# Patient Record
Sex: FEMALE | Race: WHITE | NOT HISPANIC OR LATINO | Employment: FULL TIME | URBAN - METROPOLITAN AREA
[De-identification: names, ages, dates, MRNs, and addresses within clinical notes are randomized per-mention and may not be internally consistent; named-entity substitution may affect disease eponyms.]

---

## 2020-10-01 ENCOUNTER — TRANSFERRED RECORDS (OUTPATIENT)
Dept: HEALTH INFORMATION MANAGEMENT | Facility: CLINIC | Age: 20
End: 2020-10-01

## 2021-04-20 ENCOUNTER — TRANSFERRED RECORDS (OUTPATIENT)
Dept: HEALTH INFORMATION MANAGEMENT | Facility: CLINIC | Age: 21
End: 2021-04-20

## 2022-04-24 ENCOUNTER — HEALTH MAINTENANCE LETTER (OUTPATIENT)
Age: 22
End: 2022-04-24

## 2022-06-30 ENCOUNTER — TRANSFERRED RECORDS (OUTPATIENT)
Dept: HEALTH INFORMATION MANAGEMENT | Facility: CLINIC | Age: 22
End: 2022-06-30

## 2022-11-19 ENCOUNTER — HEALTH MAINTENANCE LETTER (OUTPATIENT)
Age: 22
End: 2022-11-19

## 2023-06-01 ENCOUNTER — HEALTH MAINTENANCE LETTER (OUTPATIENT)
Age: 23
End: 2023-06-01

## 2023-12-28 ENCOUNTER — OFFICE VISIT (OUTPATIENT)
Dept: FAMILY MEDICINE | Facility: CLINIC | Age: 23
End: 2023-12-28
Payer: COMMERCIAL

## 2023-12-28 VITALS
WEIGHT: 138.3 LBS | HEART RATE: 80 BPM | BODY MASS INDEX: 22.23 KG/M2 | HEIGHT: 66 IN | OXYGEN SATURATION: 98 % | RESPIRATION RATE: 16 BRPM | DIASTOLIC BLOOD PRESSURE: 66 MMHG | TEMPERATURE: 98 F | SYSTOLIC BLOOD PRESSURE: 110 MMHG

## 2023-12-28 DIAGNOSIS — R79.89 TSH ELEVATION: ICD-10-CM

## 2023-12-28 DIAGNOSIS — G43.009 MIGRAINE WITHOUT AURA AND WITHOUT STATUS MIGRAINOSUS, NOT INTRACTABLE: Primary | ICD-10-CM

## 2023-12-28 LAB
ERYTHROCYTE [DISTWIDTH] IN BLOOD BY AUTOMATED COUNT: 12.1 % (ref 10–15)
HCT VFR BLD AUTO: 41.5 % (ref 35–47)
HGB BLD-MCNC: 13.7 G/DL (ref 11.7–15.7)
MCH RBC QN AUTO: 30.9 PG (ref 26.5–33)
MCHC RBC AUTO-ENTMCNC: 33 G/DL (ref 31.5–36.5)
MCV RBC AUTO: 94 FL (ref 78–100)
PLATELET # BLD AUTO: 252 10E3/UL (ref 150–450)
RBC # BLD AUTO: 4.44 10E6/UL (ref 3.8–5.2)
T4 FREE SERPL-MCNC: 1.42 NG/DL (ref 0.9–1.7)
TSH SERPL DL<=0.005 MIU/L-ACNC: 4.97 UIU/ML (ref 0.3–4.2)
WBC # BLD AUTO: 4.9 10E3/UL (ref 4–11)

## 2023-12-28 PROCEDURE — 90480 ADMN SARSCOV2 VAC 1/ONLY CMP: CPT | Performed by: PHYSICIAN ASSISTANT

## 2023-12-28 PROCEDURE — 36415 COLL VENOUS BLD VENIPUNCTURE: CPT | Performed by: PHYSICIAN ASSISTANT

## 2023-12-28 PROCEDURE — 91320 SARSCV2 VAC 30MCG TRS-SUC IM: CPT | Performed by: PHYSICIAN ASSISTANT

## 2023-12-28 PROCEDURE — 85027 COMPLETE CBC AUTOMATED: CPT | Performed by: PHYSICIAN ASSISTANT

## 2023-12-28 PROCEDURE — 84443 ASSAY THYROID STIM HORMONE: CPT | Performed by: PHYSICIAN ASSISTANT

## 2023-12-28 PROCEDURE — 84439 ASSAY OF FREE THYROXINE: CPT | Performed by: PHYSICIAN ASSISTANT

## 2023-12-28 PROCEDURE — 99204 OFFICE O/P NEW MOD 45 MIN: CPT | Mod: 25 | Performed by: PHYSICIAN ASSISTANT

## 2023-12-28 RX ORDER — NORETHINDRONE ACETATE AND ETHINYL ESTRADIOL 1.5; 3 MG/1; UG/1
1 TABLET ORAL DAILY
COMMUNITY

## 2023-12-28 RX ORDER — SUMATRIPTAN 50 MG/1
50 TABLET, FILM COATED ORAL
Qty: 12 TABLET | Refills: 0 | Status: SHIPPED | OUTPATIENT
Start: 2023-12-28 | End: 2024-02-29

## 2023-12-28 ASSESSMENT — ENCOUNTER SYMPTOMS: HEADACHES: 1

## 2023-12-28 NOTE — PATIENT INSTRUCTIONS
PLEASE GET AN EYE EXAM!    (G43.009) Migraine without aura and without status migrainosus, not intractable  Comment: Risks, benefits, side effects and intended purposes discussed.    Follow-up with neurology   Plan: Adult Neurology  Referral, CBC with         platelets, SUMAtriptan (IMITREX) 50 MG tablet            (R79.89) TSH elevation  Comment: well recheck as tsh was over 4 when check this spring   Plan: TSH with free T4 reflex

## 2023-12-28 NOTE — PROGRESS NOTES
"  Assessment & Plan     Migraine without aura and without status migrainosus, not intractable  Risks, benefits, side effects and intended purposes discussed.    Will try imitrex and recommend follow-up in 1 month.  Referred to neurology.    Needs to get vision checked.   - Adult Neurology  Referral; Future  - CBC with platelets  - SUMAtriptan (IMITREX) 50 MG tablet; Take 1 tablet (50 mg) by mouth at onset of headache for migraine May repeat in 2 hours. Max 4 tablets/24 hours.    TSH elevation  Was borderline when check earlier this year MOm and sister have hypothyroid   - TSH with free T4 reflex    [id number]}         Johana Rey Aaseby-Aguilera, PA-C  [facility]    Maverick Faria is a 23 year old, presenting for the following health issues:  Establish Care, Headache (Ongoing issue for the past couple years), and Dizziness (Starting in January )        12/28/2023     9:44 AM   Additional Questions   Roomed by KRISTY Cooley   Accompanied by Self     Has had chronic headaches and now starting to get dizzy randomly. Worse when she has a migraines.  Takes ibuprofen or exedrin for migraines.  Does not always help. When she has a migraine she gets eye symptoms (not blurry) like photophobia.      Started around first of the year and then went away and now the last couple mnths headaches are twice weekly and dizzyness getting worse.   Headache     History of Present Illness       Headaches:   Since the patient's last clinic visit, headaches are: no change  The patient is getting headaches:  Average twice a week  She is able to do normal daily activities when she has a migraine.  The patient is taking the following rescue/relief medications:  Ibuprofen (Advil, Motrin) and Excedrin   Patient states \"The relief is inconsistent\" from the rescue/relief medications.   The patient is taking the following medications to prevent migraines:  No medications to prevent migraines  In the past 4 " "weeks, the patient has gone to an Urgent Care or Emergency Room 0 times times due to headaches.    Reason for visit:  Consistent Dizziness, migraines/headaches  Symptom onset:  More than a month  Symptoms include:  Rabdom dizziness throughout the day. Headaches or migraines. Sometimes spinning and nauseous (not as often)  Symptom intensity:  Moderate  Symptom progression:  Staying the same  Had these symptoms before:  Yes  Has tried/received treatment for these symptoms:  No  What makes it worse:  Bright lights, sun, lack of sleep, screens/typing  What makes it better:  Sometimes when its darker it helps    She eats 2-3 servings of fruits and vegetables daily.She consumes 1 sweetened beverage(s) daily.She exercises with enough effort to increase her heart rate 10 to 19 minutes per day.  She exercises with enough effort to increase her heart rate 3 or less days per week.   She is taking medications regularly.             Review of Systems   Neurological:  Positive for headaches.      Constitutional, HEENT, cardiovascular, pulmonary, gi and gu systems are negative, except as otherwise noted.      Objective    /66 (BP Location: Right arm, Patient Position: Sitting, Cuff Size: Adult Regular)   Pulse 80   Temp 98  F (36.7  C) (Oral)   Resp 16   Ht 1.676 m (5' 6\")   Wt 62.7 kg (138 lb 4.8 oz)   LMP 12/14/2023 (Approximate)   SpO2 98%   Breastfeeding No   BMI 22.32 kg/m    Body mass index is 22.32 kg/m .  Physical Exam   GENERAL: healthy, alert and no distress  EYES: Eyes grossly normal to inspection, PERRL and conjunctivae and sclerae normal  HENT: ear canals and TM's normal, nose and mouth without ulcers or lesions  RESP: lungs clear to auscultation - no rales, rhonchi or wheezes  CV: regular rate and rhythm, normal S1 S2, no S3 or S4, no murmur, click or rub, no peripheral edema and peripheral pulses strong  MS: no gross musculoskeletal defects noted, no edema  NEURO: Normal strength and tone, sensory " exam grossly normal, mentation intact, and cranial nerves 2-12 intact  PSYCH: mentation appears normal, affect normal/bright                      Answers submitted by the patient for this visit:  Migraine Visit Questionnaire (Submitted on 12/28/2023)  Chief Complaint: Chronic problems general questions HPI Form  Headache Symptoms are: no change  How often are you getting headaches or migraines? : Average twice a week  Are you able to do normal daily activities when you have a migraine?: Yes  Migraine Rescue/Relief Medications:: Ibuprofen (Advil, Motrin), Excedrin  Effectiveness of rescue/relief medications:: The relief is inconsistent  Migraine Preventative Medications:: no medications to prevent migraines  ER or UC Visits:: 0 times  General Questionnaire (Submitted on 12/28/2023)  Chief Complaint: Chronic problems general questions HPI Form  How many servings of fruits and vegetables do you eat daily?: 2-3  On average, how many sweetened beverages do you drink each day (Examples: soda, juice, sweet tea, etc.  Do NOT count diet or artificially sweetened beverages)?: 1  How many minutes a day do you exercise enough to make your heart beat faster?: 10 to 19  How many days a week do you exercise enough to make your heart beat faster?: 3 or less  How many days per week do you miss taking your medication?: 0  General Concern (Submitted on 12/28/2023)  Chief Complaint: Chronic problems general questions HPI Form  What is the reason for your visit today?: Consistent Dizziness, migraines/headaches  When did your symptoms begin?: More than a month  What are your symptoms?: Rabdom dizziness throughout the day. Headaches or migraines. Sometimes spinning and nauseous (not as often)  How would you describe these symptoms?: Moderate  Are your symptoms:: Staying the same  Have you had these symptoms before?: Yes  Have you tried or received treatment for these symptoms before?: No  Is there anything that makes you feel worse?:  Bright lights, sun, lack of sleep, screens/typing  Is there anything that makes you feel better?: Sometimes when its darker it helps

## 2024-01-02 ENCOUNTER — MYC MEDICAL ADVICE (OUTPATIENT)
Dept: FAMILY MEDICINE | Facility: CLINIC | Age: 24
End: 2024-01-02
Payer: COMMERCIAL

## 2024-01-02 NOTE — TELEPHONE ENCOUNTER
RECORDS RECEIVED FROM: Internal   REASON FOR VISIT: Migraine without aura and without status migrainosus, not intractable    Date of Appt: 01/11/2024   NOTES (FOR ALL VISITS) STATUS DETAILS   OFFICE NOTE from referring provider Internal 12/28/2023 Dr Aaseby [facility]    OFFICE NOTE from other specialist N/A    DISCHARGE SUMMARY from hospital N/A    DISCHARGE REPORT from the ER N/A    OPERATIVE REPORT N/A    [facility] (MS ONLY) N/A    EMG N/A    EEG N/A    MEDICATION LIST N/A    IMAGING  (FOR ALL VISITS)     LUMBAR PUNCTURE N/A    TAMEKA SCAN (MOVEMENT) N/A    ULTRASOUND (CAROTID BILAT) *VASCULAR* N/A    MRI (HEAD, NECK, SPINE) N/A    CT (HEAD, NECK, SPINE) N/A

## 2024-01-10 NOTE — PROGRESS NOTES
[facility] [place]  Section of General Neurology  New Patient Visit      Giana Miles MRN# [id number]   Age: 23 year old YOB: 2000              Assessment and Plan:   Assessment:  Giana Miles is a very pleasant 23 year old female who presents in consultation for migraine headaches.  She has associated photophobia >phonophobia, nausea and brief dizzy spells as well.  I suspect as migraines improve so will dizzy spells.  No red flags to history or exam.  She notes a recent normal dilated eye exam too.  Discussed options.  Will start treatment as below with propranolol given a good choice commonly for younger females, favorable pregnancy profile if ever to occur while on it, good migraine data in general, side effects discussed.   Dizzy spells too brief to trial meclizine. Discussed imaging would be quite low yield in her case, can consider if worsening or other indications manifest.       Plan:  Magnesium oxide 400 mg Riboflavin (vitamin b2) 400 mg daily  Propranolol 60 mg daily   Imitrex --continue 50 mg as needed, take right away, can cut in half if you too sleepy   Follow up in 4 months at my [place] office          Alhaji Echevarria MD   of Neurology   [facility] [place]      History of Presenting Symptoms:   Giana Miles is a 23 year old female who presents today for evaluation of headache    Has been prone to them her whole life.   Last January getting random dizziness usually with headaches.    Migraine every couple of weaks    Headache specific questions:  Location (unilateral/whole head/etc):  Frequency 2x a week, bad migraine a few times a month  Provoking factors--light, driving, motion sick  Visual changes--none  Nausea/vomiting:  yes/no  Sensitivity to light/sound: yes  Liquid intake: a good amount  Sleep (including KATE screen)--works 2-1030 shift, goes to bed late.    Family history of headaches--none  Mood/Stress--good  Caffeine--average amount.       Dizziness lasts for a few seconds, light and sound, other migraine factors.   Eye exam looked gone  Abortive: imitrex--only taken twice, took at night, unclear.        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      Here with Jarred, boyfriend    Referring note reviewed:  Migraine without aura and without status migrainosus, not intractable  Risks, benefits, side effects and intended purposes discussed.    Will try imitrex and recommend follow-up in 1 month.  Referred to neurology.    Needs to get vision checked.   - Adult Neurology  Referral; Future  - CBC with platelets  - SUMAtriptan (IMITREX) 50 MG tablet; Take 1 tablet (50 mg) by mouth at onset of headache for migraine May repeat in 2 hours. Max 4 tablets/24 hours.     TSH elevation  Was borderline when check earlier this year MOm and sister have hypothyroid   - TSH with free T4 reflex    Past Medical History:   There is no problem list on file for this patient.    No past medical history on file.     Past Surgical History:   No past surgical history on file.     Social History:     Social History     Tobacco Use    Smoking status: Never    Smokeless tobacco: Never   Vaping Use    Vaping Use: Never used   Substance Use Topics    Alcohol use: Yes     Comment: A few drinks a month. Not a huge drinker    Drug use: Never        Family History:     Family History   Problem Relation Age of Onset    Thyroid Disease Mother         Hypo-thyroid        Medications:     Current Outpatient Medications   Medication Sig    JUNEL 1.5/30 1.5-30 MG-MCG tablet Take 1 tablet by mouth daily    SUMAtriptan (IMITREX) 50 MG tablet Take 1 tablet (50 mg) by mouth at onset of headache for migraine May repeat in 2 hours. Max 4 tablets/24 hours.     No current facility-administered medications for this visit.        Allergies:     Allergies   Allergen Reactions    Prednisone Muscle Pain (Myalgia)        Review of Systems:   As noted above     Physical  Exam:   Vitals: /72   Pulse 113   Wt 61.7 kg (136 lb)   LMP 12/14/2023 (Approximate)   SpO2 98%   BMI 21.95 kg/m         Neuro:   General Appearance: No apparent distress, well-nourished, well-groomed, pleasant     Mental Status: Alert and oriented to person, place, and time. Speech fluent and comprehension intact. No dysarthria.     Cranial Nerves:   II: Visual fields: normal  III: Pupils: 3 mm, equal, round, reactive to light   III,IV,VI: Extraocular Movements: intact   V: Facial sensation: intact to light touch  VII: Facial strength: intact without asymmetry  VIII: Hearing: intact grossly  IX: Palate: intact   XI: Shoulder shrug: intact  XII: Tongue movement: normal     Motor Exam:   5/5 Diffusely    No drift is present. No abnormal movements. Tone is normal throughout.    Sensory: intact to light touch    Coordination: no dysmetria noted    Reflexes: biceps, triceps, brachioradialis, patellar, and ankle jerks 2+ and symmetric.              The total time of this encounter today amounted to 47 minutes. This time included time spent with the patient, prep work, ordering tests, and performing post visit documentation.

## 2024-01-11 ENCOUNTER — PRE VISIT (OUTPATIENT)
Dept: NEUROLOGY | Facility: CLINIC | Age: 24
End: 2024-01-11

## 2024-01-11 ENCOUNTER — OFFICE VISIT (OUTPATIENT)
Dept: NEUROLOGY | Facility: CLINIC | Age: 24
End: 2024-01-11
Attending: PHYSICIAN ASSISTANT
Payer: COMMERCIAL

## 2024-01-11 VITALS
OXYGEN SATURATION: 98 % | SYSTOLIC BLOOD PRESSURE: 125 MMHG | BODY MASS INDEX: 21.95 KG/M2 | DIASTOLIC BLOOD PRESSURE: 72 MMHG | HEART RATE: 113 BPM | WEIGHT: 136 LBS

## 2024-01-11 DIAGNOSIS — G43.009 MIGRAINE WITHOUT AURA AND WITHOUT STATUS MIGRAINOSUS, NOT INTRACTABLE: ICD-10-CM

## 2024-01-11 PROCEDURE — 99204 OFFICE O/P NEW MOD 45 MIN: CPT | Performed by: STUDENT IN AN ORGANIZED HEALTH CARE EDUCATION/TRAINING PROGRAM

## 2024-01-11 RX ORDER — PROPRANOLOL HCL 60 MG
60 CAPSULE, EXTENDED RELEASE 24HR ORAL DAILY
Qty: 90 CAPSULE | Refills: 1 | Status: SHIPPED | OUTPATIENT
Start: 2024-01-11 | End: 2024-05-07

## 2024-01-11 NOTE — LETTER
1/11/2024         RE: Giana Miles  [address] [facility] [address]        Dear Colleague,    Thank you for referring your patient, Giana Miles, to the [facility] [place]. Please see a copy of my visit note below.    [facility] [place]  Section of General Neurology  New Patient Visit      Giana Miles MRN# [id number]   Age: 23 year old YOB: 2000              Assessment and Plan:   Assessment:  Giana Miles is a very pleasant 23 year old female who presents in consultation for migraine headaches.  She has associated photophobia >phonophobia, nausea and brief dizzy spells as well.  I suspect as migraines improve so will dizzy spells.  No red flags to history or exam.  She notes a recent normal dilated eye exam too.  Discussed options.  Will start treatment as below with propranolol given a good choice commonly for younger females, favorable pregnancy profile if ever to occur while on it, good migraine data in general, side effects discussed.   Dizzy spells too brief to trial meclizine. Discussed imaging would be quite low yield in her case, can consider if worsening or other indications manifest.       Plan:  Magnesium oxide 400 mg Riboflavin (vitamin b2) 400 mg daily  Propranolol 60 mg daily   Imitrex --continue 50 mg as needed, take right away, can cut in half if you too sleepy   Follow up in 4 months at my [place] office          Alhaji Echevarria MD   of Neurology   [facility] [place]      History of Presenting Symptoms:   Giana Miles is a 23 year old female who presents today for evaluation of headache    Has been prone to them her whole life.   Last January getting random dizziness usually with headaches.    Migraine every couple of weaks    Headache specific questions:  Location (unilateral/whole head/etc):  Frequency 2x a week, bad migraine a few times a month  Provoking factors--light, driving, motion sick  Visual  changes--none  Nausea/vomiting:  yes/no  Sensitivity to light/sound: yes  Liquid intake: a good amount  Sleep (including KATE screen)--works 2-1030 shift, goes to bed late.    Family history of headaches--none  Mood/Stress--good  Caffeine--average amount.      Dizziness lasts for a few seconds, light and sound, other migraine factors.   Eye exam looked gone  Abortive: imitrex--only taken twice, took at night, unclear.        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      Here with Jarred, boyfriend    Referring note reviewed:  Migraine without aura and without status migrainosus, not intractable  Risks, benefits, side effects and intended purposes discussed.    Will try imitrex and recommend follow-up in 1 month.  Referred to neurology.    Needs to get vision checked.   - Adult Neurology  Referral; Future  - CBC with platelets  - SUMAtriptan (IMITREX) 50 MG tablet; Take 1 tablet (50 mg) by mouth at onset of headache for migraine May repeat in 2 hours. Max 4 tablets/24 hours.     TSH elevation  Was borderline when check earlier this year MOm and sister have hypothyroid   - TSH with free T4 reflex    Past Medical History:   There is no problem list on file for this patient.    No past medical history on file.     Past Surgical History:   No past surgical history on file.     Social History:     Social History     Tobacco Use     Smoking status: Never     Smokeless tobacco: Never   Vaping Use     Vaping Use: Never used   Substance Use Topics     Alcohol use: Yes     Comment: A few drinks a month. Not a huge drinker     Drug use: Never        Family History:     Family History   Problem Relation Age of Onset     Thyroid Disease Mother         Hypo-thyroid        Medications:     Current Outpatient Medications   Medication Sig     JUNEL 1.5/30 1.5-30 MG-MCG tablet Take 1 tablet by mouth daily     SUMAtriptan (IMITREX) 50 MG tablet Take 1 tablet (50 mg) by mouth at onset of headache for  migraine May repeat in 2 hours. Max 4 tablets/24 hours.     No current facility-administered medications for this visit.        Allergies:     Allergies   Allergen Reactions     Prednisone Muscle Pain (Myalgia)        Review of Systems:   As noted above     Physical Exam:   Vitals: /72   Pulse 113   Wt 61.7 kg (136 lb)   LMP 12/14/2023 (Approximate)   SpO2 98%   BMI 21.95 kg/m         Neuro:   General Appearance: No apparent distress, well-nourished, well-groomed, pleasant     Mental Status: Alert and oriented to person, place, and time. Speech fluent and comprehension intact. No dysarthria.     Cranial Nerves:   II: Visual fields: normal  III: Pupils: 3 mm, equal, round, reactive to light   III,IV,VI: Extraocular Movements: intact   V: Facial sensation: intact to light touch  VII: Facial strength: intact without asymmetry  VIII: Hearing: intact grossly  IX: Palate: intact   XI: Shoulder shrug: intact  XII: Tongue movement: normal     Motor Exam:   5/5 Diffusely    No drift is present. No abnormal movements. Tone is normal throughout.    Sensory: intact to light touch    Coordination: no dysmetria noted    Reflexes: biceps, triceps, brachioradialis, patellar, and ankle jerks 2+ and symmetric.              The total time of this encounter today amounted to 47 minutes. This time included time spent with the patient, prep work, ordering tests, and performing post visit documentation.      Again, thank you for allowing me to participate in the care of your patient.        Sincerely,        Ander Echevarria MD

## 2024-01-11 NOTE — PATIENT INSTRUCTIONS
Magnesium oxide 400 mg Riboflavin (vitamin b2) 400 mg daily    Imitrex --continue as needed, take right away, can cut in half if you too sleepy   Antivert discussed  Propranolol 60 mg    Low yield re imaging, can consider if worsening or other indications.

## 2024-02-29 ENCOUNTER — OFFICE VISIT (OUTPATIENT)
Dept: FAMILY MEDICINE | Facility: CLINIC | Age: 24
End: 2024-02-29
Payer: COMMERCIAL

## 2024-02-29 VITALS
RESPIRATION RATE: 16 BRPM | HEIGHT: 66 IN | SYSTOLIC BLOOD PRESSURE: 110 MMHG | WEIGHT: 136.8 LBS | DIASTOLIC BLOOD PRESSURE: 71 MMHG | OXYGEN SATURATION: 99 % | BODY MASS INDEX: 21.98 KG/M2 | TEMPERATURE: 98.2 F | HEART RATE: 79 BPM

## 2024-02-29 DIAGNOSIS — Z11.59 NEED FOR HEPATITIS C SCREENING TEST: ICD-10-CM

## 2024-02-29 DIAGNOSIS — Z12.4 CERVICAL CANCER SCREENING: ICD-10-CM

## 2024-02-29 DIAGNOSIS — Z11.3 SCREENING FOR STDS (SEXUALLY TRANSMITTED DISEASES): Primary | ICD-10-CM

## 2024-02-29 DIAGNOSIS — G43.009 MIGRAINE WITHOUT AURA AND WITHOUT STATUS MIGRAINOSUS, NOT INTRACTABLE: ICD-10-CM

## 2024-02-29 DIAGNOSIS — Z11.4 SCREENING FOR HIV (HUMAN IMMUNODEFICIENCY VIRUS): ICD-10-CM

## 2024-02-29 PROCEDURE — 99213 OFFICE O/P EST LOW 20 MIN: CPT | Performed by: PHYSICIAN ASSISTANT

## 2024-02-29 RX ORDER — SUMATRIPTAN 50 MG/1
50 TABLET, FILM COATED ORAL
Qty: 12 TABLET | Refills: 0 | Status: SHIPPED | OUTPATIENT
Start: 2024-02-29 | End: 2024-05-07

## 2024-02-29 ASSESSMENT — ENCOUNTER SYMPTOMS: HEADACHES: 1

## 2024-02-29 NOTE — PROGRESS NOTES
"  Assessment & Plan   Gets pap done at ob/gyn       Migraine without aura and without status migrainosus, not intractable  Risks, benefits, side effects and intended purposes discussed.      - SUMAtriptan (IMITREX) 50 MG tablet; Take 1 tablet (50 mg) by mouth at onset of headache for migraine May repeat in 2 hours. Max 4 tablets/24 hours.                Subjective   Giana is a 23 year old, presenting for the following health issues:  Headache (Follow Up)        2/29/2024     9:13 AM   Additional Questions   Roomed by KRISTY Cooley   Accompanied by Self     Giana recently saw neurology for migraines and was put on a prophylactic propranolol ER  60 mg and states it does seem to help her headaches. Has had to take imitrex a few times with aleve and this helps.         Headache     History of Present Illness       Reason for visit:  Follow up for migraines, headaches, thyroid    She eats 2-3 servings of fruits and vegetables daily.She consumes 1 sweetened beverage(s) daily.She exercises with enough effort to increase her heart rate 20 to 29 minutes per day.  She exercises with enough effort to increase her heart rate 3 or less days per week.   She is taking medications regularly.             Review of Systems  Constitutional, HEENT, cardiovascular, pulmonary, gi and gu systems are negative, except as otherwise noted.      Objective    /71 (BP Location: Right arm, Patient Position: Sitting, Cuff Size: Adult Regular)   Pulse 79   Temp 98.2  F (36.8  C) (Oral)   Resp 16   Ht 1.676 m (5' 6\")   Wt 62.1 kg (136 lb 12.8 oz)   LMP 02/27/2024 (Exact Date)   SpO2 99%   Breastfeeding No   BMI 22.08 kg/m    Body mass index is 22.08 kg/m .  Physical Exam   GENERAL: alert and no distress  HENT: ear canals and TM's normal, nose and mouth without ulcers or lesions  RESP: lungs clear to auscultation - no rales, rhonchi or wheezes  CV: regular rate and rhythm, normal S1 S2, no S3 or S4, no murmur, click or rub, no " peripheral edema   NEURO: Normal strength and tone, mentation intact and speech normal  PSYCH: mentation appears normal, affect normal/bright            Signed Electronically by: Ramona Ann Aaseby-Aguilera, PA-C    Answers submitted by the patient for this visit:  General Questionnaire (Submitted on 2/29/2024)  Chief Complaint: Chronic problems general questions HPI Form  What is the reason for your visit today? : Follow up for migraines, headaches, thyroid  How many servings of fruits and vegetables do you eat daily?: 2-3  On average, how many sweetened beverages do you drink each day (Examples: soda, juice, sweet tea, etc.  Do NOT count diet or artificially sweetened beverages)?: 1  How many minutes a day do you exercise enough to make your heart beat faster?: 20 to 29  How many days a week do you exercise enough to make your heart beat faster?: 3 or less  How many days per week do you miss taking your medication?: 0

## 2024-04-24 ENCOUNTER — MYC MEDICAL ADVICE (OUTPATIENT)
Dept: FAMILY MEDICINE | Facility: CLINIC | Age: 24
End: 2024-04-24
Payer: COMMERCIAL

## 2024-04-24 NOTE — TELEPHONE ENCOUNTER
Patient Quality Outreach    Patient is due for the following:   Chlamydia Screening    Next Steps:   No follow up needed at this time.    Type of outreach:    Sent [employer] message.    Next Steps:  Reach out within 90 days via [employer].    Max number of attempts reached: No. Will try again in 90 days if patient still on fail list.    Questions for provider review:    None           Olivia Hernandez, ADRIENNE  Chart routed to Care Team.

## 2024-05-07 ENCOUNTER — OFFICE VISIT (OUTPATIENT)
Dept: NEUROLOGY | Facility: CLINIC | Age: 24
End: 2024-05-07
Payer: COMMERCIAL

## 2024-05-07 VITALS
BODY MASS INDEX: 21.79 KG/M2 | WEIGHT: 135 LBS | SYSTOLIC BLOOD PRESSURE: 110 MMHG | DIASTOLIC BLOOD PRESSURE: 72 MMHG | OXYGEN SATURATION: 97 % | HEART RATE: 76 BPM

## 2024-05-07 DIAGNOSIS — R42 DIZZINESS: Primary | ICD-10-CM

## 2024-05-07 DIAGNOSIS — G43.009 MIGRAINE WITHOUT AURA AND WITHOUT STATUS MIGRAINOSUS, NOT INTRACTABLE: ICD-10-CM

## 2024-05-07 PROCEDURE — 99214 OFFICE O/P EST MOD 30 MIN: CPT | Performed by: STUDENT IN AN ORGANIZED HEALTH CARE EDUCATION/TRAINING PROGRAM

## 2024-05-07 PROCEDURE — G2211 COMPLEX E/M VISIT ADD ON: HCPCS | Performed by: STUDENT IN AN ORGANIZED HEALTH CARE EDUCATION/TRAINING PROGRAM

## 2024-05-07 RX ORDER — MECLIZINE HYDROCHLORIDE 25 MG/1
25 TABLET ORAL 3 TIMES DAILY PRN
Qty: 30 TABLET | Refills: 3 | Status: SHIPPED | OUTPATIENT
Start: 2024-05-07

## 2024-05-07 RX ORDER — PROPRANOLOL HYDROCHLORIDE 80 MG/1
80 CAPSULE, EXTENDED RELEASE ORAL DAILY
Qty: 90 CAPSULE | Refills: 1 | Status: SHIPPED | OUTPATIENT
Start: 2024-05-07 | End: 2024-08-23

## 2024-05-07 RX ORDER — RIZATRIPTAN BENZOATE 5 MG/1
5 TABLET ORAL
Qty: 18 TABLET | Refills: 4 | Status: SHIPPED | OUTPATIENT
Start: 2024-05-07

## 2024-05-07 NOTE — PROGRESS NOTES
[facility]/[place]  Section of General Neurology  Return Patient Visit    Giana Miles MRN# [id number]   Age: 23 year old YOB: 2000            Assessment and Plan:   Assessment:  Giana Miles is a very pleasant 23 year old female who presents in follow up for migraine headaches.  They are improved since our last visit.  Advil usually helps but imitrex she does not find as helpful.  Now down to 2-3 a month commonly.  Excellent news.  She is tolerating propranolol well.  Dizziness persists at times/sporadically.  There still is likely some migrainous elements. No clear BPPV/non specific overall.  Not clearly lightheaded/not worsened by propranolol.  Will continue a trial and error approach in this regard.  Plan as below.      Plan:  Propranolol 80 mg (slight increase)  Change imitrex maxalt as needed for migraines  Antivert (meclizine) try tough dizzy days  MRI brain given episodic dizziness has been refractory/to exclude secondary causes of this or headaches      Future ideas   Vestibular PT  [facility]   Trialing other migraine options (Ubrelvy PRN e.g.)  To return in 3-4 months and reach out sooner with any issues questions or changes         Alhaji Echevarria MD   of Neurology   [facility]      Interval history:     Dizziness still pretty often.  Still pretty brief.  Lights at airport provokes it, driving as well.  Head rush, goes away.   Headaches --down to a few a month, imitrex doesn't really help.    Discussed maxalt 5 mg as needed  Discussed antivert PRN   Propranolol --cut down headaches a good amount.    Magnesium oxide 400 mg Riboflavin (vitamin b2) 400 mg daily  Going to [place] soon, [place].     A/P at last visit  Giana Miles is a very pleasant 23 year old female who presents in consultation for migraine headaches.  She has associated photophobia >phonophobia, nausea and brief dizzy spells as well.  I  suspect as migraines improve so will dizzy spells.  No red flags to history or exam.  She notes a recent normal dilated eye exam too.  Discussed options.  Will start treatment as below with propranolol given a good choice commonly for younger females, favorable pregnancy profile if ever to occur while on it, good migraine data in general, side effects discussed.   Dizzy spells too brief to trial meclizine. Discussed imaging would be quite low yield in her case, can consider if worsening or other indications manifest.       Plan:  Magnesium oxide 400 mg Riboflavin (vitamin b2) 400 mg daily  Propranolol 60 mg daily   Imitrex --continue 50 mg as needed, take right away, can cut in half if you too sleepy   Follow up in 4 months at my [place] office     Social History:     Social History     Tobacco Use    Smoking status: Never    Smokeless tobacco: Never   Vaping Use    Vaping status: Never Used   Substance Use Topics    Alcohol use: Yes     Comment: A few drinks a month. Not a huge drinker    Drug use: Never        Family History:     Family History   Problem Relation Age of Onset    Thyroid Disease Mother         Hypo-thyroid        Medications:     Current Outpatient Medications   Medication Sig Dispense Refill    JUNEL 1.5/30 1.5-30 MG-MCG tablet Take 1 tablet by mouth daily      propranolol ER (INDERAL LA) 60 MG 24 hr capsule Take 1 capsule (60 mg) by mouth daily 90 capsule 1    SUMAtriptan (IMITREX) 50 MG tablet Take 1 tablet (50 mg) by mouth at onset of headache for migraine May repeat in 2 hours. Max 4 tablets/24 hours. 12 tablet 0     No current facility-administered medications for this visit.        Allergies:     Allergies   Allergen Reactions    Prednisone Muscle Pain (Myalgia)          Physical Exam:   Vitals: /72   Pulse 76   Wt 61.2 kg (135 lb)   SpO2 97%   BMI 21.79 kg/m     Neuro:   General Appearance: No apparent distress, well-nourished, well-groomed, pleasant      Mental Status: Alert and  oriented to person, place, and time. Speech fluent and comprehension intact. No dysarthria.      Cranial Nerves:   II: Visual fields: normal  III: Pupils: 3 mm, equal, round, reactive to light   III,IV,VI: Extraocular Movements: intact   V: Facial sensation: intact to light touch  VII: Facial strength: intact without asymmetry  VIII: Hearing: intact grossly  IX: Palate: intact   XI: Shoulder shrug: intact  XII: Tongue movement: normal     Motor Exam:   5/5 Diffusely     No drift is present. No abnormal movements. Tone is normal throughout.     Sensory: intact to light touch     Coordination: no dysmetria noted     Reflexes: biceps, triceps, brachioradialis, patellar, and ankle jerks 2+ and symmetric.                    The total time of this encounter today amounted to 32 minutes. This time included time spent with the patient, prep work, ordering tests, and performing post visit documentation.    The longitudinal plan of care for migraine headaches was addressed during this visit. Due to the added complexity in care, I will continue to support MsCharlie Jd in the subsequent management of this condition(s) and with the ongoing continuity of care of this condition(s).

## 2024-05-07 NOTE — LETTER
5/7/2024         RE: Giana Miles  [address] [place] [facility] [address]        Dear Colleague,    Thank you for referring your patient, Giana Miles, to the [facility]. Please see a copy of my visit note below.    [facility]/[place]  Section of General Neurology  Return Patient Visit    Giana Miles MRN# [id number]   Age: 23 year old YOB: 2000            Assessment and Plan:   Assessment:  Giana Miles is a very pleasant 23 year old female who presents in follow up for migraine headaches.  They are improved since our last visit.  Advil usually helps but imitrex she does not find as helpful.  Now down to 2-3 a month commonly.  Excellent news.  She is tolerating propranolol well.  Dizziness persists at times/sporadically.  There still is likely some migrainous elements. No clear BPPV/non specific overall.  Not clearly lightheaded/not worsened by propranolol.  Will continue a trial and error approach in this regard.  Plan as below.      Plan:  Propranolol 80 mg (slight increase)  Change imitrex maxalt as needed for migraines  Antivert (meclizine) try tough dizzy days  MRI brain given episodic dizziness has been refractory/to exclude secondary causes of this or headaches      Future ideas   Vestibular PT  [facility]   Trialing other migraine options (Ubrelvy PRN e.g.)  To return in 3-4 months and reach out sooner with any issues questions or changes         Alhaji Echevarria MD   of Neurology   [facility]      Interval history:     Dizziness still pretty often.  Still pretty brief.  Lights at airport provokes it, driving as well.  Head rush, goes away.   Headaches --down to a few a month, imitrex doesn't really help.    Discussed maxalt 5 mg as needed  Discussed antivert PRN   Propranolol --cut down headaches a good amount.    Magnesium oxide 400 mg Riboflavin (vitamin b2) 400 mg daily  Going  to [place] soon, [place].     A/P at last visit  Giana Miles is a very pleasant 23 year old female who presents in consultation for migraine headaches.  She has associated photophobia >phonophobia, nausea and brief dizzy spells as well.  I suspect as migraines improve so will dizzy spells.  No red flags to history or exam.  She notes a recent normal dilated eye exam too.  Discussed options.  Will start treatment as below with propranolol given a good choice commonly for younger females, favorable pregnancy profile if ever to occur while on it, good migraine data in general, side effects discussed.   Dizzy spells too brief to trial meclizine. Discussed imaging would be quite low yield in her case, can consider if worsening or other indications manifest.       Plan:  Magnesium oxide 400 mg Riboflavin (vitamin b2) 400 mg daily  Propranolol 60 mg daily   Imitrex --continue 50 mg as needed, take right away, can cut in half if you too sleepy   Follow up in 4 months at my [place] office     Social History:     Social History     Tobacco Use     Smoking status: Never     Smokeless tobacco: Never   Vaping Use     Vaping status: Never Used   Substance Use Topics     Alcohol use: Yes     Comment: A few drinks a month. Not a huge drinker     Drug use: Never        Family History:     Family History   Problem Relation Age of Onset     Thyroid Disease Mother         Hypo-thyroid        Medications:     Current Outpatient Medications   Medication Sig Dispense Refill     JUNEL 1.5/30 1.5-30 MG-MCG tablet Take 1 tablet by mouth daily       propranolol ER (INDERAL LA) 60 MG 24 hr capsule Take 1 capsule (60 mg) by mouth daily 90 capsule 1     SUMAtriptan (IMITREX) 50 MG tablet Take 1 tablet (50 mg) by mouth at onset of headache for migraine May repeat in 2 hours. Max 4 tablets/24 hours. 12 tablet 0     No current facility-administered medications for this visit.        Allergies:     Allergies   Allergen Reactions      Prednisone Muscle Pain (Myalgia)          Physical Exam:   Vitals: /72   Pulse 76   Wt 61.2 kg (135 lb)   SpO2 97%   BMI 21.79 kg/m     Neuro:   General Appearance: No apparent distress, well-nourished, well-groomed, pleasant      Mental Status: Alert and oriented to person, place, and time. Speech fluent and comprehension intact. No dysarthria.      Cranial Nerves:   II: Visual fields: normal  III: Pupils: 3 mm, equal, round, reactive to light   III,IV,VI: Extraocular Movements: intact   V: Facial sensation: intact to light touch  VII: Facial strength: intact without asymmetry  VIII: Hearing: intact grossly  IX: Palate: intact   XI: Shoulder shrug: intact  XII: Tongue movement: normal     Motor Exam:   5/5 Diffusely     No drift is present. No abnormal movements. Tone is normal throughout.     Sensory: intact to light touch     Coordination: no dysmetria noted     Reflexes: biceps, triceps, brachioradialis, patellar, and ankle jerks 2+ and symmetric.                    The total time of this encounter today amounted to 32 minutes. This time included time spent with the patient, prep work, ordering tests, and performing post visit documentation.    The longitudinal plan of care for migraine headaches was addressed during this visit. Due to the added complexity in care, I will continue to support Ms. Miles in the subsequent management of this condition(s) and with the ongoing continuity of care of this condition(s).      Again, thank you for allowing me to participate in the care of your patient.        Sincerely,        Ander Echevarria MD

## 2024-05-07 NOTE — NURSING NOTE
"Giana Miles is a 23 year old female who presents for:  Chief Complaint   Patient presents with    Headache     Return visit        Initial Vitals:  /72   Pulse 76   Wt 61.2 kg (135 lb)   SpO2 97%   BMI 21.79 kg/m   Estimated body mass index is 21.79 kg/m  as calculated from the following:    Height as of 2/29/24: 1.676 m (5' 6\").    Weight as of this encounter: 61.2 kg (135 lb).. Body surface area is 1.69 meters squared. BP completed using cuff size: oscar Jamison    "

## 2024-05-07 NOTE — PATIENT INSTRUCTIONS
Propranolol 80 mg (slight increase)  Change imitrex maxalt as needed for migraines  Antivert (meclizine) try tough dizzy days  MRI brain       Future ideas   Message me if you want to try this:   Vestibular PT  [facility]     Seeing me back 3-4 months.

## 2024-05-25 ENCOUNTER — HOSPITAL ENCOUNTER (OUTPATIENT)
Dept: MRI IMAGING | Facility: CLINIC | Age: 24
Discharge: HOME | End: 2024-05-25
Attending: STUDENT IN AN ORGANIZED HEALTH CARE EDUCATION/TRAINING PROGRAM | Admitting: STUDENT IN AN ORGANIZED HEALTH CARE EDUCATION/TRAINING PROGRAM
Payer: COMMERCIAL

## 2024-05-25 DIAGNOSIS — G43.009 MIGRAINE WITHOUT AURA AND WITHOUT STATUS MIGRAINOSUS, NOT INTRACTABLE: ICD-10-CM

## 2024-05-25 DIAGNOSIS — R42 DIZZINESS: ICD-10-CM

## 2024-05-25 PROCEDURE — 70551 MRI BRAIN STEM W/O DYE: CPT

## 2024-06-15 ENCOUNTER — HEALTH MAINTENANCE LETTER (OUTPATIENT)
Age: 24
End: 2024-06-15

## 2024-08-22 NOTE — PROGRESS NOTES
[facility]/[place]  Section of General Neurology  Return Patient Visit    Giana Miles MRN# [id number]   Age: 23 year old YOB: 2000            Assessment and Plan:   Assessment:  Giana Miles is a very pleasant 23 year old female who presents in follow up for migraine headaches.  They remain improved, at less than one per week, excellent.  Maxalt helps as needed.  Dizzy spells continue to be an issue at times.  MRI brain looks great, reviewed today.  No secondary cause of headaches or dizziness seen (dizziness appears more vertiginous than anything, not clearly representing BPPV however).    She is content to leave our pills alone to discussion of other ideas (adding topiramate can improve both headaches and dizziness at times e.g.) given good headache control which is fair.  Will plan as below.        Plan:  Continue antivert 25 mg as needed  Continue maxalt 5 mg as needed for bad headache days  Continue propranolol 80 mg daily for migraine prevention  Vestibular PT   Recommended against chiropractic manipulation of the neck    Future ideas:  Increasing maxalt to 10 mg  Adding something like topamax to try to help migraines and dizziness  [facility]    Alhaji Echevarria MD   of Neurology   [facility]      Interval history:   Last migraine 3 days ago  Less frequent and less intense  Dizziness ---infrequent---meclizine unclear if helping  Maxalt--Does help a little bit.   Works through it.    Less than 1 per week.   A couple times per day--dizziness  Feels like spinning.   Unclear what provokes it, screens and light, car movement all provoke it.      A/P at last visit  Giana Miles is a very pleasant 23 year old female who presents in follow up for migraine headaches.  They are improved since our last visit.  Advil usually helps but imitrex she does not find as helpful.  Now down to 2-3 a month commonly.  Excellent news.  She is  tolerating propranolol well.  Dizziness persists at times/sporadically.  There still is likely some migrainous elements. No clear BPPV/non specific overall.  Not clearly lightheaded/not worsened by propranolol.  Will continue a trial and error approach in this regard.  Plan as below.      Plan:  Propranolol 80 mg (slight increase)  Change imitrex maxalt as needed for migraines  Antivert (meclizine) try tough dizzy days  MRI brain given episodic dizziness has been refractory/to exclude secondary causes of this or headaches        Future ideas   Vestibular PT  [facility]   Trialing other migraine options (Ubrelvy PRN e.g.)  To return in 3-4 months and reach out sooner with any issues questions or changes           Past Medical History:   There is no problem list on file for this patient.    No past medical history on file.     Past Surgical History:   No past surgical history on file.     Social History:     Social History     Tobacco Use    Smoking status: Never    Smokeless tobacco: Never   Vaping Use    Vaping status: Never Used   Substance Use Topics    Alcohol use: Yes     Comment: A few drinks a month. Not a huge drinker    Drug use: Never        Family History:     Family History   Problem Relation Age of Onset    Thyroid Disease Mother         Hypo-thyroid        Medications:     Current Outpatient Medications   Medication Sig Dispense Refill    JUNEL 1.5/30 1.5-30 MG-MCG tablet Take 1 tablet by mouth daily      meclizine (ANTIVERT) 25 MG tablet Take 1 tablet (25 mg) by mouth 3 times daily as needed for dizziness 30 tablet 3    propranolol ER (INDERAL LA) 80 MG 24 hr capsule Take 1 capsule (80 mg) by mouth daily 90 capsule 1    rizatriptan (MAXALT) 5 MG tablet Take 1 tablet (5 mg) by mouth at onset of headache for migraine May repeat in 2 hours. Max 6 tablets/24 hours. 18 tablet 4     No current facility-administered medications for this visit.        Allergies:     Allergies   Allergen  Reactions    Prednisone Muscle Pain (Myalgia)          Physical Exam:   Vitals: /69   Pulse 64   Wt 60.8 kg (134 lb)   SpO2 98%   BMI 21.63 kg/m         Neuro:   General Appearance: No apparent distress, well-nourished, well-groomed, pleasant     Mental Status: Alert and oriented to person, place, and time. Speech fluent and comprehension intact. No dysarthria.      Cranial Nerves:   II: Visual fields: normal  III: Pupils: 3 mm, equal, round, reactive to light   III,IV,VI: Extraocular Movements: intact   V: Facial sensation: intact to light touch  VII: Facial strength: intact without asymmetry      Motor Exam:   5/5 diffusely     Sensory: intact to light touch    Coordination: no dysmetria noted    Reflexes: biceps, triceps, brachioradialis, patellar 2+ and symmetric.            Data: Pertinent prior to visit   EXAM: MR BRAIN W/O CONTRAST  LOCATION: [facility]  DATE: 5/25/2024     INDICATION: exclude secondary cause for dizziness, headaches, dizziness persists despite treatment; Headache; Acute HA (< 3 months), no complicating features  COMPARISON: None.  TECHNIQUE: Routine multiplanar multisequence head MRI without intravenous contrast.     FINDINGS:  INTRACRANIAL CONTENTS: No acute or subacute infarct. No mass, acute hemorrhage, or extra-axial fluid collections. Normal brain parenchymal signal. Normal ventricles and sulci. Normal position of the cerebellar tonsils.      SELLA: No abnormality accounting for technique.     OSSEOUS STRUCTURES/SOFT TISSUES: Normal marrow signal. The major intracranial vascular flow voids are maintained.      ORBITS: No abnormality accounting for technique.      SINUSES/MASTOIDS: No paranasal sinus mucosal disease. No middle ear or mastoid effusion.                                                                       IMPRESSION:  1.  Normal head MRI.    I personally reviewed the above images and reports.  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 This time included time spent with the patient, prep work, ordering tests, and performing post visit documentation.    The longitudinal plan of care for migraine headaches, dizziness was addressed during this visit. Due to the added complexity in care, I will continue to support Ms Jd in the subsequent management of this condition(s) and with the ongoing continuity of care of this condition(s).

## 2024-08-23 ENCOUNTER — OFFICE VISIT (OUTPATIENT)
Dept: NEUROLOGY | Facility: CLINIC | Age: 24
End: 2024-08-23
Payer: COMMERCIAL

## 2024-08-23 VITALS
OXYGEN SATURATION: 98 % | HEART RATE: 64 BPM | WEIGHT: 134 LBS | SYSTOLIC BLOOD PRESSURE: 114 MMHG | DIASTOLIC BLOOD PRESSURE: 69 MMHG | BODY MASS INDEX: 21.63 KG/M2

## 2024-08-23 DIAGNOSIS — G43.009 MIGRAINE WITHOUT AURA AND WITHOUT STATUS MIGRAINOSUS, NOT INTRACTABLE: Primary | ICD-10-CM

## 2024-08-23 DIAGNOSIS — R42 DIZZINESS: ICD-10-CM

## 2024-08-23 PROCEDURE — G2211 COMPLEX E/M VISIT ADD ON: HCPCS | Performed by: STUDENT IN AN ORGANIZED HEALTH CARE EDUCATION/TRAINING PROGRAM

## 2024-08-23 PROCEDURE — 99214 OFFICE O/P EST MOD 30 MIN: CPT | Performed by: STUDENT IN AN ORGANIZED HEALTH CARE EDUCATION/TRAINING PROGRAM

## 2024-08-23 RX ORDER — PROPRANOLOL HYDROCHLORIDE 80 MG/1
80 CAPSULE, EXTENDED RELEASE ORAL DAILY
Qty: 90 CAPSULE | Refills: 1 | Status: SHIPPED | OUTPATIENT
Start: 2024-08-23

## 2024-08-23 NOTE — LETTER
8/23/2024      Giana Miles  [address] [facility] [address]      Dear Colleague,    Thank you for referring your patient, Giana Miles, to the [facility] NEUROLOG[facility]. Please see a copy of my visit note below.    [facility]/[place]  Section of General Neurology  Return Patient Visit    Giana Miles MRN# [id number]   Age: 23 year old YOB: 2000            Assessment and Plan:   Assessment:  Giana Miles is a very pleasant 23 year old female who presents in follow up for migraine headaches.  They remain improved, at less than one per week, excellent.  Maxalt helps as needed.  Dizzy spells continue to be an issue at times.  MRI brain looks great, reviewed today.  No secondary cause of headaches or dizziness seen (dizziness appears more vertiginous than anything, not clearly representing BPPV however).    She is content to leave our pills alone to discussion of other ideas (adding topiramate can improve both headaches and dizziness at times e.g.) given good headache control which is fair.  Will plan as below.        Plan:  Continue antivert 25 mg as needed  Continue maxalt 5 mg as needed for bad headache days  Continue propranolol 80 mg daily for migraine prevention  Vestibular PT   Recommended against chiropractic manipulation of the neck    Future ideas:  Increasing maxalt to 10 mg  Adding something like topamax to try to help migraines and dizziness  [facility]    Alhaji Echevarria MD   of Neurology   [facility]      Interval history:   Last migraine 3 days ago  Less frequent and less intense  Dizziness ---infrequent---meclizine unclear if helping  Maxalt--Does help a little bit.   Works through it.    Less than 1 per week.   A couple times per day--dizziness  Feels like spinning.   Unclear what provokes it, screens and light, car movement all provoke it.      A/P at last visit  Giana Miles is a very  pleasant 23 year old female who presents in follow up for migraine headaches.  They are improved since our last visit.  Advil usually helps but imitrex she does not find as helpful.  Now down to 2-3 a month commonly.  Excellent news.  She is tolerating propranolol well.  Dizziness persists at times/sporadically.  There still is likely some migrainous elements. No clear BPPV/non specific overall.  Not clearly lightheaded/not worsened by propranolol.  Will continue a trial and error approach in this regard.  Plan as below.      Plan:  Propranolol 80 mg (slight increase)  Change imitrex maxalt as needed for migraines  Antivert (meclizine) try tough dizzy days  MRI brain given episodic dizziness has been refractory/to exclude secondary causes of this or headaches        Future ideas   Vestibular PT  [facility]   Trialing other migraine options (Ubrelvy PRN e.g.)  To return in 3-4 months and reach out sooner with any issues questions or changes           Past Medical History:   There is no problem list on file for this patient.    No past medical history on file.     Past Surgical History:   No past surgical history on file.     Social History:     Social History     Tobacco Use     Smoking status: Never     Smokeless tobacco: Never   Vaping Use     Vaping status: Never Used   Substance Use Topics     Alcohol use: Yes     Comment: A few drinks a month. Not a huge drinker     Drug use: Never        Family History:     Family History   Problem Relation Age of Onset     Thyroid Disease Mother         Hypo-thyroid        Medications:     Current Outpatient Medications   Medication Sig Dispense Refill     JUNEL 1.5/30 1.5-30 MG-MCG tablet Take 1 tablet by mouth daily       meclizine (ANTIVERT) 25 MG tablet Take 1 tablet (25 mg) by mouth 3 times daily as needed for dizziness 30 tablet 3     propranolol ER (INDERAL LA) 80 MG 24 hr capsule Take 1 capsule (80 mg) by mouth daily 90 capsule 1     rizatriptan  (MAXALT) 5 MG tablet Take 1 tablet (5 mg) by mouth at onset of headache for migraine May repeat in 2 hours. Max 6 tablets/24 hours. 18 tablet 4     No current facility-administered medications for this visit.        Allergies:     Allergies   Allergen Reactions     Prednisone Muscle Pain (Myalgia)          Physical Exam:   Vitals: /69   Pulse 64   Wt 60.8 kg (134 lb)   SpO2 98%   BMI 21.63 kg/m         Neuro:   General Appearance: No apparent distress, well-nourished, well-groomed, pleasant     Mental Status: Alert and oriented to person, place, and time. Speech fluent and comprehension intact. No dysarthria.      Cranial Nerves:   II: Visual fields: normal  III: Pupils: 3 mm, equal, round, reactive to light   III,IV,VI: Extraocular Movements: intact   V: Facial sensation: intact to light touch  VII: Facial strength: intact without asymmetry      Motor Exam:   5/5 diffusely     Sensory: intact to light touch    Coordination: no dysmetria noted    Reflexes: biceps, triceps, brachioradialis, patellar 2+ and symmetric.            Data: Pertinent prior to visit   EXAM: MR BRAIN W/O CONTRAST  LOCATION: [facility]  DATE: 5/25/2024     INDICATION: exclude secondary cause for dizziness, headaches, dizziness persists despite treatment; Headache; Acute HA (< 3 months), no complicating features  COMPARISON: None.  TECHNIQUE: Routine multiplanar multisequence head MRI without intravenous contrast.     FINDINGS:  INTRACRANIAL CONTENTS: No acute or subacute infarct. No mass, acute hemorrhage, or extra-axial fluid collections. Normal brain parenchymal signal. Normal ventricles and sulci. Normal position of the cerebellar tonsils.      SELLA: No abnormality accounting for technique.     OSSEOUS STRUCTURES/SOFT TISSUES: Normal marrow signal. The major intracranial vascular flow voids are maintained.      ORBITS: No abnormality accounting for technique.      SINUSES/MASTOIDS: No paranasal sinus  mucosal disease. No middle ear or mastoid effusion.                                                                       IMPRESSION:  1.  Normal head MRI.    I personally reviewed the above images and reports.  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 This time included time spent with the patient, prep work, ordering tests, and performing post visit documentation.    The longitudinal plan of care for migraine headaches, dizziness was addressed during this visit. Due to the added complexity in care, I will continue to support Ms Miles in the subsequent management of this condition(s) and with the ongoing continuity of care of this condition(s).      Again, thank you for allowing me to participate in the care of your patient.        Sincerely,        Ander Echevarria MD

## 2024-08-23 NOTE — PATIENT INSTRUCTIONS
Future ideas:  Increasing maxalt to 10 mg  Adding something like topamax to try to help migraines and dizziness      For now  Continue anti vert 25 mg as needed  Continue maxalt as needed for bad headache days  Continue propranolol 80 mg daily     Vestibular PT     Message me if you want to see the [facility]

## 2024-08-23 NOTE — NURSING NOTE
"  Giana Miles is a 23 year old female who presents for:  Chief Complaint   Patient presents with    Headache        Initial Vitals:  /69   Pulse 64   Wt 60.8 kg (134 lb)   SpO2 98%   BMI 21.63 kg/m   Estimated body mass index is 21.63 kg/m  as calculated from the following:    Height as of 2/29/24: 1.676 m (5' 6\").    Weight as of this encounter: 60.8 kg (134 lb).. Body surface area is 1.68 meters squared. BP completed using cuff size: oscar Schwartz    "

## 2024-10-02 ENCOUNTER — OFFICE VISIT (OUTPATIENT)
Dept: URGENT CARE | Facility: URGENT CARE | Age: 24
End: 2024-10-02
Payer: COMMERCIAL

## 2024-10-02 VITALS
WEIGHT: 134 LBS | RESPIRATION RATE: 16 BRPM | OXYGEN SATURATION: 97 % | DIASTOLIC BLOOD PRESSURE: 70 MMHG | TEMPERATURE: 98 F | SYSTOLIC BLOOD PRESSURE: 118 MMHG | HEART RATE: 70 BPM | BODY MASS INDEX: 21.63 KG/M2

## 2024-10-02 DIAGNOSIS — R07.89 ATYPICAL CHEST PAIN: ICD-10-CM

## 2024-10-02 DIAGNOSIS — R10.13 EPIGASTRIC PAIN: Primary | ICD-10-CM

## 2024-10-02 PROCEDURE — 36415 COLL VENOUS BLD VENIPUNCTURE: CPT | Performed by: FAMILY MEDICINE

## 2024-10-02 PROCEDURE — 83690 ASSAY OF LIPASE: CPT | Performed by: FAMILY MEDICINE

## 2024-10-02 PROCEDURE — 93000 ELECTROCARDIOGRAM COMPLETE: CPT | Performed by: FAMILY MEDICINE

## 2024-10-02 PROCEDURE — 80053 COMPREHEN METABOLIC PANEL: CPT | Performed by: FAMILY MEDICINE

## 2024-10-02 PROCEDURE — 99214 OFFICE O/P EST MOD 30 MIN: CPT | Performed by: FAMILY MEDICINE

## 2024-10-02 NOTE — PROGRESS NOTES
ICD-10-CM    1. Epigastric pain  R10.13 Comprehensive metabolic panel (BMP + Alb, Alk Phos, ALT, AST, Total. Bili, TP)     Lipase     Comprehensive metabolic panel (BMP + Alb, Alk Phos, ALT, AST, Total. Bili, TP)     Lipase      2. Atypical chest pain  R07.89 EKG 12-lead complete w/read - Clinics          EKG tonight shows NSR with normal axis and no ST/T changes suggestive of ischemia or strain.  GI cocktail was of minimal help for the discomfort, which was mild at the time she ingested the Maalox/viscous lidocaine combination.    Low suspicion for serious underlying intra-abdominal pathology at this time given patient's overall well appearance and intermittent nature of symptoms.  Will check lipase to rule out pancreatitis and CMP to assess liver function and electrolytes.    PLAN:  Patient Instructions   Famotidine 20 mg twice daily for the next week.  This medication reduces acid production in the stomach.  You can buy this over-the-counter.    Today we are also checking some lab work, which will have results tomorrow.        History of Present Illness:  Giana is an otherwise healthy 24 year old female who comes into  with complaints of short periods of sharp chest and upper abdominal pain. The pain started yesterday evening. She took a dose of Pepto Bismol that had little effect on the pain. She denies heart palpitations, irregular heart beats, or shortness of breath. Of note, patient returned from [facility] 6 days ago with diarrhea 3 days after returning. Patient describes feeling clammy with increased sweating/hot flashes's followed by chills intermittently. Otherwise, denies fever or muscle aches. Reported no unintentional weight loss. No bloody stools.  No diarrhea today.    No personal or family history of significant GI disease.    OBJECTIVE:  /70   Pulse 70   Temp 98  F (36.7  C) (Oral)   Resp 16   Wt 60.8 kg (134 lb)   SpO2 97%   BMI 21.63 kg/m    GEN: well-appearing, in NAD  EYES:  anicteric sclerae  ENT: oral MMM  Lungs:  CTAB  CV:  RRR, no murmurs noted  Abd: soft, active bowel sounds, not distended focal epigastric tenderness, negative Nicholson's sign, no guarding or rebound, no midline hernias noted

## 2024-10-03 LAB
ALBUMIN SERPL BCG-MCNC: 4.4 G/DL (ref 3.5–5.2)
ALP SERPL-CCNC: 54 U/L (ref 40–150)
ALT SERPL W P-5'-P-CCNC: 14 U/L (ref 0–50)
ANION GAP SERPL CALCULATED.3IONS-SCNC: 8 MMOL/L (ref 7–15)
AST SERPL W P-5'-P-CCNC: 20 U/L (ref 0–45)
BILIRUB SERPL-MCNC: 0.8 MG/DL
BUN SERPL-MCNC: 10 MG/DL (ref 6–20)
CALCIUM SERPL-MCNC: 9.2 MG/DL (ref 8.8–10.4)
CHLORIDE SERPL-SCNC: 104 MMOL/L (ref 98–107)
CREAT SERPL-MCNC: 0.65 MG/DL (ref 0.51–0.95)
EGFRCR SERPLBLD CKD-EPI 2021: >90 ML/MIN/1.73M2
GLUCOSE SERPL-MCNC: 82 MG/DL (ref 70–99)
HCO3 SERPL-SCNC: 25 MMOL/L (ref 22–29)
LIPASE SERPL-CCNC: 26 U/L (ref 13–60)
POTASSIUM SERPL-SCNC: 4.5 MMOL/L (ref 3.4–5.3)
PROT SERPL-MCNC: 7.4 G/DL (ref 6.4–8.3)
SODIUM SERPL-SCNC: 137 MMOL/L (ref 135–145)

## 2024-10-03 NOTE — PATIENT INSTRUCTIONS
Famotidine 20 mg twice daily for the next week.  This medication reduces acid production in the stomach.  You can buy this over-the-counter.    Today we are also checking some lab work, which will have results tomorrow.

## 2024-11-05 ENCOUNTER — THERAPY VISIT (OUTPATIENT)
Dept: PHYSICAL THERAPY | Facility: CLINIC | Age: 24
End: 2024-11-05
Attending: STUDENT IN AN ORGANIZED HEALTH CARE EDUCATION/TRAINING PROGRAM
Payer: COMMERCIAL

## 2024-11-05 DIAGNOSIS — R42 DIZZINESS: Primary | ICD-10-CM

## 2024-11-05 PROCEDURE — 97535 SELF CARE MNGMENT TRAINING: CPT | Mod: GP | Performed by: PHYSICAL THERAPIST

## 2024-11-05 PROCEDURE — 97161 PT EVAL LOW COMPLEX 20 MIN: CPT | Mod: GP | Performed by: PHYSICAL THERAPIST

## 2024-11-05 NOTE — PROGRESS NOTES
"PHYSICAL THERAPY EVALUATION  Type of Visit: Evaluation       Fall Risk Screen:  Fall screen completed by: PT  Have you fallen 2 or more times in the past year?: (Patient-Rptd) No  Have you fallen and had an injury in the past year?: (Patient-Rptd) No  Is patient a fall risk?: No    Subjective         Presenting condition or subjective complaint: (Patient-Rptd) Daily dizziness; see additional information below in vestibular evaluation.  Date of onset: 08/23/24    Relevant medical history:  Unremarkable  Dates & types of surgery:  None    Prior diagnostic imaging/testing results: (Patient-Rptd) MRI     Prior therapy history for the same diagnosis, illness or injury: (Patient-Rptd) No      Prior Level of Function  Active and independent; no formal exercise routine    Living Environment  Social support: (Patient-Rptd) With family members   Type of home: (Patient-Rptd) [facility]   Stairs to enter the home: (Patient-Rptd) No       Ramp: (Patient-Rptd) No   Stairs inside the home: (Patient-Rptd) Yes (Patient-Rptd) 15 Is there a railing: (Patient-Rptd) Yes  Help at home: (Patient-Rptd) Medication and/or finances    Employment: (Patient-Rptd) Yes (Patient-Rptd) Customer service  Hobbies/Interests: (Patient-Rptd) Traveling    Patient goals for therapy: (Patient-Rptd) I can do all activities but some days when its worse especially in the morning its harder to do things, ill stay in bed. At work will have to sit when i could be activitly working or moving.  Minimize the dizziness or figure out exercises/routines that will help.    Pain assessment:   Patient with migraine and headache history (began as a child)  Headache: right side > left side \"pounding and throbbing\", if neck or shoulder pain contributes to headache  Migraines: starts as minor headache and if unable to control will trigger headache  Meditation has been helping, last 1.5 weeks has had 3 migraines  Averaging ~ 1 headache/migraine a month     Objective    " "  Cognitive Status Examination  Orientation: Oriented to person, place and time   Level of Consciousness: Alert  Follows Commands and Answers Questions: 100% of the time  Personal Safety and Judgement: Intact  Memory: Intact    OBSERVATION: Pleasant and in no apparent distress  POSTURE: Slight forward head with rounded shoulders  PALPATION: Tight cervical and scapular musculature    BED MOBILITY: Independent    TRANSFERS: Independent    GAIT: Independent    BALANCE: No significant instability observed with functional mobility.       VESTIBULAR EVALUATION  ADDITIONAL HISTORY:  Worse symptoms in the AM,  \"it feels like I am looking at a fan that is spinning\" - occurs 1-2x/week  During the day it is a few seconds or minute of symptoms (\"I feel like a get a head rush, I get light headed\") triggered by lights or screens - occurs daily  Description of symptoms: (Patient-Rptd) Attacks of dizziness; I feel like the room is spinning; Light-headedness  Dizzy attacks:   Start: (Patient-Rptd) About a year and a half ago mar 2023 maybe longer   Last attack: (Patient-Rptd) 11/03/24   Frequency of occurrences: (Patient-Rptd) Daily-few times a week   Length of attack: (Patient-Rptd) Seconds-few minutes or when its worse up to 10  Difficulty hearing:  No issues with hearing  Noise in ears? (Patient-Rptd) No    Alleviates symptoms: (Patient-Rptd) Darker areas, limited light, sunglasses  Worsens symptoms: (Patient-Rptd) Sun, bright lights, screens  Activities that bring on symptoms: (Patient-Rptd) Riding in a car; Driving a car; Other  (Patient-Rptd) Walking in areas with bright lights    Pertinent visual history: No visional concerns per patient  Pertinent history of current vestibular problem: Migraines  DHI: Total Score: (Patient-Rptd) 18    Cervicogenic Screen    Neck ROM decreased right cervical rotation, no pain just tight,  decreased cervical flexion (pull in back of neck), extension WNL, decreased right side bend with pull on " "left   Vertebral Artery Test Normal     Oculomotor Screen    Ocular ROM Normal   Smooth Pursuit Normal   Saccades Normal   VOR Normal - \"it was fine\"   VOR Cancellation Normal     Infrared Goggle Exam Vestibular Suppressant in Last 24 Hours? No  Exam Completed With: Infrared goggles   Spontaneous Nystagmus Negative   Gaze Evoked Nystagmus Negative   Head Shake Horizontal Nystagmus Negative   Positional Testing     Left Right   Damar-Hallpike Negative Negative   HSCC Supine Roll Test Negative Negative     Assessment & Plan   CLINICAL IMPRESSIONS  Medical Diagnosis: Dizziness    Treatment Diagnosis: Decreased Activity Tolerance, Dizziness   Impression/Assessment: Patient is a 24 year old female with headache and migraine history with onset of dizziness symptoms about 1.5 years ago; is working with neurology and medication has helped decreased frequency of headaches and migraines.  The following significant findings have been identified: Decreased ROM/flexibility, Decreased joint mobility, Decreased strength, Decreased activity tolerance, and Dizziness. These impairments interfere with their ability to perform self care tasks, work tasks, recreational activities, household chores, and driving  as compared to previous level of function.     Clinical Decision Making (Complexity):  Clinical Presentation: Stable/Uncomplicated  Clinical Presentation Rationale: based on medical and personal factors listed in PT evaluation  Clinical Decision Making (Complexity): Low complexity    PLAN OF CARE  Treatment Interventions:  Modalities: Cryotherapy, E-stim, Hot Pack  Interventions: Manual Therapy, Neuromuscular Re-education, Therapeutic Activity, Therapeutic Exercise, Self-Care/Home Management, Standardized Testing    Long Term Goals     PT Goal 1  Goal Identifier: HEP  Goal Description: Pt will demonstrate independence with gaze stabilization, visual motion habituation, and cervical exercises (ROM, stretching, and strengthening) to " promote reduction of dizziness symptoms to improve tolerance for daily activities and job duties.  Target Date: 01/03/25  PT Goal 2  Goal Identifier: Cervical ROM  Goal Description: Patient will demonstrate improved cervical ROM to WNL and symmetrical in order to minimize trigger of headaches that contribute to onset of migraines.  Target Date: 01/03/25  PT Goal 3  Goal Identifier: Compensatory Strategies  Goal Description: Patient will verbalize understanding of triggers and ways to minimize triggers in order to decrease frequency of dizziness symptoms as well as headaches and migraines.  Goal Progress: Initiated  Target Date: 01/03/25      Frequency of Treatment: 1x/week with decrease in frequency as appropriate  Duration of Treatment: 60 days    Education Assessment:   Learner/Method: Patient;Listening;Reading;No Barriers to Learning    Risks and benefits of evaluation/treatment have been explained.   Patient/Family/caregiver agrees with Plan of Care.     Evaluation Time:     PT Eval, Low Complexity Minutes (41791): 29     Signing Clinician: Bell Matos, PT, DPT

## 2024-11-18 ENCOUNTER — THERAPY VISIT (OUTPATIENT)
Dept: PHYSICAL THERAPY | Facility: CLINIC | Age: 24
End: 2024-11-18
Payer: COMMERCIAL

## 2024-11-18 DIAGNOSIS — R42 DIZZINESS: Primary | ICD-10-CM

## 2024-11-18 PROCEDURE — 97140 MANUAL THERAPY 1/> REGIONS: CPT | Mod: GP

## 2024-11-18 PROCEDURE — 97112 NEUROMUSCULAR REEDUCATION: CPT | Mod: GP

## 2024-11-18 PROCEDURE — 97535 SELF CARE MNGMENT TRAINING: CPT | Mod: GP

## 2025-02-26 ENCOUNTER — OFFICE VISIT (OUTPATIENT)
Dept: URGENT CARE | Facility: URGENT CARE | Age: 25
End: 2025-02-26
Payer: COMMERCIAL

## 2025-02-26 VITALS
DIASTOLIC BLOOD PRESSURE: 72 MMHG | BODY MASS INDEX: 21.63 KG/M2 | SYSTOLIC BLOOD PRESSURE: 108 MMHG | OXYGEN SATURATION: 97 % | HEART RATE: 81 BPM | RESPIRATION RATE: 20 BRPM | WEIGHT: 134 LBS | TEMPERATURE: 98.7 F

## 2025-02-26 DIAGNOSIS — H10.9 CONJUNCTIVITIS OF BOTH EYES, UNSPECIFIED CONJUNCTIVITIS TYPE: ICD-10-CM

## 2025-02-26 DIAGNOSIS — J06.9 VIRAL URI WITH COUGH: Primary | ICD-10-CM

## 2025-02-26 PROCEDURE — 3074F SYST BP LT 130 MM HG: CPT | Performed by: FAMILY MEDICINE

## 2025-02-26 PROCEDURE — 3078F DIAST BP <80 MM HG: CPT | Performed by: FAMILY MEDICINE

## 2025-02-26 PROCEDURE — 99213 OFFICE O/P EST LOW 20 MIN: CPT | Performed by: FAMILY MEDICINE

## 2025-02-26 RX ORDER — BENZONATATE 100 MG/1
100-200 CAPSULE ORAL 3 TIMES DAILY PRN
Qty: 30 CAPSULE | Refills: 0 | Status: SHIPPED | OUTPATIENT
Start: 2025-02-26

## 2025-02-26 RX ORDER — POLYMYXIN B SULFATE AND TRIMETHOPRIM 1; 10000 MG/ML; [USP'U]/ML
1 SOLUTION OPHTHALMIC 4 TIMES DAILY
Qty: 10 ML | Refills: 0 | Status: SHIPPED | OUTPATIENT
Start: 2025-02-26 | End: 2025-03-03

## 2025-02-26 NOTE — PROGRESS NOTES
ICD-10-CM    1. Viral URI with cough  J06.9 benzonatate (TESSALON) 100 MG capsule      2. Conjunctivitis of both eyes, unspecified conjunctivitis type  H10.9 polymixin b-trimethoprim (POLYTRIM) 61766-2.1 UNIT/ML-% ophthalmic solution        Suspect possible adenovirus given significant conjunctivitis.  However, given presence of purulent discharge from both eyes, will cover possible bacterial superinfection with Polytrim.  No evidence of pneumonia or bronchospasm on today's exam.  Pt to continue pushing fluids and using OTC medications as needed for symptomatic management.  Will add Rx benzonatate to hopefully help reduce coughing.    Note written for work.    Patient Instructions   Benzonatate 1-2 pills every 8 hours as needed to help reduce coughing.  You can use this along with the over-the-counter medications you're already using.    Use the eye drops four times per day for 5 days.    SUBJECTIVE:  Giana Miles is a 24 year old female who presents to  today with about 5 days of illness, with symptoms including sore throat, headache, coughing, congestion, and now eye redness.  COVID tests x 2 negative.  Sore throat has resolved.  Not feeling short of breath.  Eyes have both been crusty and have had thick discharge.  Using Mucinex, Dayquil/Nyquil-type medications to help with symptoms.  [place]  so lots of potential illness exposures.    OBJECTIVE:  /72   Pulse 81   Temp 98.7  F (37.1  C)   Resp 20   Wt 60.8 kg (134 lb)   LMP 02/24/2025   SpO2 97%   BMI 21.63 kg/m    GEN: mildly ill-appearing, in NAD  EYES: bright red conjunctival injection bilaterally  ENT: TMs normal, oral MMM, no pharyngeal erythema  Neck: no LAD noted  Lungs:  CTAB, good air entry bilaterally

## 2025-02-26 NOTE — PATIENT INSTRUCTIONS
Benzonatate 1-2 pills every 8 hours as needed to help reduce coughing.  You can use this along with the over-the-counter medications you're already using.    Use the eye drops four times per day for 5 days.

## 2025-02-26 NOTE — LETTER
2025    Giana Miles   2000    To Whom it May Concern;    Please excuse Giana Miles from work due to illness.  She was seen in our urgent care clinic on 2025, for a non-COVID illness.  She needs to be fever-free for at least 24 hours and must be feeling at least somewhat better before she is cleared to return to work.    Sincerely,        Elsie Mendoza MD

## 2025-04-17 ENCOUNTER — OFFICE VISIT (OUTPATIENT)
Dept: FAMILY MEDICINE | Facility: CLINIC | Age: 25
End: 2025-04-17
Payer: COMMERCIAL

## 2025-04-17 VITALS
HEIGHT: 66 IN | SYSTOLIC BLOOD PRESSURE: 107 MMHG | BODY MASS INDEX: 21.89 KG/M2 | TEMPERATURE: 98.1 F | WEIGHT: 136.2 LBS | RESPIRATION RATE: 20 BRPM | OXYGEN SATURATION: 96 % | HEART RATE: 100 BPM | DIASTOLIC BLOOD PRESSURE: 69 MMHG

## 2025-04-17 DIAGNOSIS — J40 BRONCHITIS: ICD-10-CM

## 2025-04-17 DIAGNOSIS — K21.00 GASTROESOPHAGEAL REFLUX DISEASE WITH ESOPHAGITIS WITHOUT HEMORRHAGE: ICD-10-CM

## 2025-04-17 DIAGNOSIS — Z30.41 ENCOUNTER FOR SURVEILLANCE OF CONTRACEPTIVE PILLS: ICD-10-CM

## 2025-04-17 DIAGNOSIS — Z00.00 WELL ADULT EXAM: Primary | ICD-10-CM

## 2025-04-17 RX ORDER — ALBUTEROL SULFATE 90 UG/1
1 INHALANT RESPIRATORY (INHALATION) EVERY 6 HOURS PRN
Qty: 18 G | Refills: 0 | Status: SHIPPED | OUTPATIENT
Start: 2025-04-17

## 2025-04-17 RX ORDER — OMEPRAZOLE 20 MG/1
20 CAPSULE, DELAYED RELEASE ORAL DAILY
Qty: 30 CAPSULE | Refills: 0 | Status: SHIPPED | OUTPATIENT
Start: 2025-04-17

## 2025-04-17 RX ORDER — AZITHROMYCIN 250 MG/1
TABLET, FILM COATED ORAL
Qty: 6 TABLET | Refills: 0 | Status: SHIPPED | OUTPATIENT
Start: 2025-04-17 | End: 2025-04-22

## 2025-04-17 RX ORDER — NORETHINDRONE ACETATE AND ETHINYL ESTRADIOL 1.5; 3 MG/1; UG/1
1 TABLET ORAL DAILY
Qty: 90 TABLET | Refills: 3 | Status: SHIPPED | OUTPATIENT
Start: 2025-04-17

## 2025-04-17 SDOH — HEALTH STABILITY: PHYSICAL HEALTH: ON AVERAGE, HOW MANY DAYS PER WEEK DO YOU ENGAGE IN MODERATE TO STRENUOUS EXERCISE (LIKE A BRISK WALK)?: 4 DAYS

## 2025-04-17 SDOH — HEALTH STABILITY: PHYSICAL HEALTH: ON AVERAGE, HOW MANY MINUTES DO YOU ENGAGE IN EXERCISE AT THIS LEVEL?: 20 MIN

## 2025-04-17 ASSESSMENT — PAIN SCALES - GENERAL: PAINLEVEL_OUTOF10: NO PAIN (0)

## 2025-04-17 ASSESSMENT — SOCIAL DETERMINANTS OF HEALTH (SDOH): HOW OFTEN DO YOU GET TOGETHER WITH FRIENDS OR RELATIVES?: ONCE A WEEK

## 2025-04-17 NOTE — PROGRESS NOTES
Preventive Care Visit  [facility]  Odalis Coles MD, Family Medicine  Apr 17, 2025      Assessment & Plan     (Z00.00) Well adult exam  (primary encounter diagnosis)  Comment: we discussed healthy eating   Discussed regular exercise   Discussed maintaining ideal weight   Pap was done at obgyn ( requested records )     (K21.00) Gastroesophageal reflux disease with esophagitis without hemorrhage  Comment: cough likely due to Gerd and bronchitis   Discussed diet modification   Recommend to avoid spicy and citrus food .  Plan: omeprazole (PRILOSEC) 20 MG DR capsule            (J40) Bronchitis  Comment:   Plan: albuterol (PROAIR HFA/PROVENTIL HFA/VENTOLIN         HFA) 108 (90 Base) MCG/ACT inhaler,         azithromycin (ZITHROMAX) 250 MG tablet    We discussed if it fails to improve will recommend             (Z30.41) Encounter for surveillance of contraceptive pills  Comment:   Plan: JUNEL 1.5/30 1.5-30 MG-MCG tablet                  Counseling  Appropriate preventive services were addressed with this patient via screening, questionnaire, or discussion as appropriate for fall prevention, nutrition, physical activity, Tobacco-use cessation, social engagement, weight loss and cognition.  Checklist reviewing preventive services available has been given to the patient.  Reviewed patient's diet, addressing concerns and/or questions.         Maverick Faria is a 24 year old, presenting for the following:  Physical (nonfasting)        4/17/2025     1:43 PM   Additional Questions   Roomed by Nina   Accompanied by self      Please abstract the following data from this visit with this patient into the appropriate field in Epic:    Tests that can be patient reported without a hard copy:    Pap smear done on this date: 10/26/24 (approximately), by this group: [facility], results were WNL.     Other Tests found in the patient's chart through Chart Review/Care Everywhere:    Note to  Abstraction: If this section is blank, no results were found via Chart Review/Care Everywhere.       HPI     History of Present Illness-  Giana Miles, 24-year-old female  - Symptoms began in February 2025 with red eyes, cough, facial pain, congestion, and headache.  - Initial symptoms resolved except for a persistent dry cough.  - Cough worsens at night and in cold air.  - Occasional chest tightness associated with the cough.  - No facial pain currently.  - History of similar episodes following bad colds, with cough persisting until medical intervention.  - First occurrence approximately 8 years ago, treated with an inhaler.  - Another episode a couple of years ago, treated at urgent care with unspecified medication.  - Previous reaction to prednisone, causing leg pain.   Advance Care Planning            4/17/2025   General Health   How would you rate your overall physical health? Good   Feel stress (tense, anxious, or unable to sleep) Not at all         4/17/2025   Nutrition   Three or more servings of calcium each day? Yes   Diet: Regular (no restrictions)   How many servings of fruit and vegetables per day? (!) 2-3   How many sweetened beverages each day? 0-1         4/17/2025   Exercise   Days per week of moderate/strenous exercise 4 days   Average minutes spent exercising at this level 20 min         4/17/2025   Social Factors   Frequency of gathering with friends or relatives Once a week   Worry food won't last until get money to buy more No   Food not last or not have enough money for food? No   Do you have housing? (Housing is defined as stable permanent housing and does not include staying ouside in a car, in a tent, in an abandoned building, in an overnight shelter, or couch-surfing.) No   Are you worried about losing your housing? No   Lack of transportation? No   Unable to get utilities (heat,electricity)? No   Want help with housing or utility concern? No   (!) HOUSING CONCERN PRESENT      4/17/2025    Dental   Dentist two times every year? Yes         Today's PHQ-2 Score:       4/17/2025     1:11 PM   PHQ-2 ( 1999 [employer])   Q1: Little interest or pleasure in doing things 0   Q2: Feeling down, depressed or hopeless 0   PHQ-2 Score 0    Q1: Little interest or pleasure in doing things Not at all   Q2: Feeling down, depressed or hopeless Not at all   PHQ-2 Score 0       Patient-reported           4/17/2025   Substance Use   Alcohol more than 3/day or more than 7/wk No   Do you use any other substances recreationally? No     Social History     Tobacco Use    Smoking status: Never    Smokeless tobacco: Never   Vaping Use    Vaping status: Never Used   Substance Use Topics    Alcohol use: Yes     Comment: A few drinks a month. Not a huge drinker    Drug use: Never          Mammogram Screening - Mammogram every 1-2 years updated in Health Maintenance based on mutual decision making        4/17/2025   STI Screening   New sexual partner(s) since last STI/HIV test? No     History of abnormal Pap smear: No - age 30-64 HPV with reflex Pap every 5 years recommended             4/17/2025   Contraception/Family Planning   Questions about contraception or family planning No        Reviewed and updated as needed this visit by Provider                    No past medical history on file.  No past surgical history on file.      Review of Systems  CONSTITUTIONAL: NEGATIVE for fever, chills, change in weight  INTEGUMENTARY/SKIN: NEGATIVE for worrisome rashes, moles or lesions  EYES: NEGATIVE for vision changes or irritation  ENT/MOUTH: NEGATIVE for ear, mouth and throat problems  RESP: cough     BREAST: NEGATIVE for masses, tenderness or discharge  CV: NEGATIVE for chest pain, palpitations or peripheral edema  GI: NEGATIVE for nausea, abdominal pain, heartburn, or change in bowel habits  : NEGATIVE for frequency, dysuria, or hematuria  MUSCULOSKELETAL: NEGATIVE for significant arthralgias or myalgia  NEURO: NEGATIVE for weakness,  "dizziness or paresthesias  ENDOCRINE: NEGATIVE for temperature intolerance, skin/hair changes  HEME: NEGATIVE for bleeding problems  PSYCHIATRIC: NEGATIVE for changes in mood or affect     Objective    Exam  /69 (BP Location: Right arm, Patient Position: Sitting, Cuff Size: Adult Regular)   Pulse 100   Temp 98.1  F (36.7  C) (Oral)   Resp 20   Ht 1.67 m (5' 5.75\")   Wt 61.8 kg (136 lb 3.2 oz)   LMP 04/03/2025 (Approximate)   SpO2 96%   BMI 22.15 kg/m     Estimated body mass index is 22.15 kg/m  as calculated from the following:    Height as of this encounter: 1.67 m (5' 5.75\").    Weight as of this encounter: 61.8 kg (136 lb 3.2 oz).    Physical Exam  GENERAL: alert and no distress  EYES: Eyes grossly normal to inspection, PERRL and conjunctivae and sclerae normal  HENT: ear canals and TM's normal, nose and mouth without ulcers or lesions  NECK: no adenopathy, no asymmetry, masses, or scars  RESP: lungs clear to auscultation - no rales, rhonchi or wheezes  CV: regular rate and rhythm, normal S1 S2, no S3 or S4, no murmur, click or rub, no peripheral edema  ABDOMEN: soft, nontender, no hepatosplenomegaly, no masses and bowel sounds normal  MS: no gross musculoskeletal defects noted, no edema  SKIN: no suspicious lesions or rashes  NEURO: Normal strength and tone, mentation intact and speech normal  PSYCH: mentation appears normal, affect normal/bright        Signed Electronically by: Odalis Coles MD    "